# Patient Record
Sex: MALE | Employment: FULL TIME | URBAN - METROPOLITAN AREA
[De-identification: names, ages, dates, MRNs, and addresses within clinical notes are randomized per-mention and may not be internally consistent; named-entity substitution may affect disease eponyms.]

---

## 2017-05-25 ENCOUNTER — HOSPITAL ENCOUNTER (OUTPATIENT)
Dept: PHYSICAL THERAPY | Age: 32
Discharge: HOME | End: 2017-05-25
Payer: COMMERCIAL

## 2017-05-25 PROCEDURE — 97530 THERAPEUTIC ACTIVITIES: CPT

## 2017-05-25 PROCEDURE — 97140 MANUAL THERAPY 1/> REGIONS: CPT

## 2017-05-25 PROCEDURE — 97110 THERAPEUTIC EXERCISES: CPT

## 2017-05-25 PROCEDURE — 97162 PT EVAL MOD COMPLEX 30 MIN: CPT
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
## 2017-06-01 ENCOUNTER — APPOINTMENT (OUTPATIENT)
Dept: PHYSICAL THERAPY | Age: 32
End: 2017-06-01
Payer: COMMERCIAL

## 2017-06-02 ENCOUNTER — HOSPITAL ENCOUNTER (OUTPATIENT)
Dept: PHYSICAL THERAPY | Age: 32
Discharge: HOME | End: 2017-06-02
Payer: COMMERCIAL

## 2017-06-02 PROCEDURE — 97110 THERAPEUTIC EXERCISES: CPT

## 2017-06-02 PROCEDURE — 97140 MANUAL THERAPY 1/> REGIONS: CPT

## 2017-06-02 NOTE — PROGRESS NOTES
PHYSICAL THERAPY - DAILY TREATMENT NOTE    Patient Name: Rosenda Pan        Date: 2017  : 1985   YES Patient  Verified  Visit #:   2     Insurance: Payor: Annette Champion / Plan:  [address] [facility] / Product Type: Commerical /      In time: 735 Out time: 820   Total Treatment Time: 45     Medicare Time Tracking (below)   Total Timed Codes (min):  NA 1:1 Treatment Time:  NA     TREATMENT AREA =  R shoulder    SUBJECTIVE    Pain Level (on 0 to 10 scale):   10   Medication Changes/New allergies or changes in medical history, any new surgeries or procedures? YES     If yes, update Summary List   Subjective Functional Status/Changes:  []  No changes reported   Pt is taking pain medication for relief. He c/o intermittent pain that increases with movement of his R UE. He is trying not to elevate above shoulder height. OBJECTIVE    Physical Therapy Evaluation - Shoulder    Posture: [x] Poor    [] Fair    [] Good    Describe: rounded shoulders, forward-flexed    Modalities Rationale:                 min [] Estim, type/location:      [] att [] unatt [] w/US [] w/ice [] w/heat     min [] Mechanical Traction: type/lbs     [] pro [] sup [] int [] cont [] before manual [] after manual     min [] Ultrasound, settings/location:        min [] Iontophoresis w/ dexamethasone, location:     [] take home patch [] in clinic   10 min [x]  Ice [] Heat location/position: Supine to R shoulder     min [] Vasopneumatic Device, press/temp:       min [] Other:     [] Skin assessment post-treatment (if applicable):   [] intact [] redness- no adverse reaction   []redness  adverse reaction:       27 min Therapeutic Exercise:  [x]  See flow sheet   Rationale:      increase ROM and increase strength to improve the patients ability to perform ADL's with improved periscapular stability. min Therapeutic Activity: FOTO Administration, review of apporpriate posturing with work ADL's. Rationale:    To determine a functional baseline from which to build a therapeutic exercise program.    8 min Manual Therapy: Manual TPR R levator scapulae and PROM of R UE. Rationale:      decrease pain, increase ROM and increase tissue extensibility to improve patient's ability to perform ADL's with decreased radicular symptoms. min Patient Education:  YES  Reviewed HEP   []  Progressed/Changed HEP based on:   HEP issued     Other Objective/Functional Measures:    R UE AROM:  Flex= 155  Abd= 85  ER= 55  Painful along posterior/middle delt with all end ranges. Post Treatment Pain Level (on 0 to 10) scale:   4  / 10     ASSESSMENT    Assessment/Changes in Function:     Advised and instructed pt to proper posture, especially prior to movement of R UE to avoid impingement pain. No adverse effects to new there ex. []  See Progress Note/Recertification   Patient will continue to benefit from skilled PT services to modify and progress therapeutic interventions, address functional mobility deficits, address ROM deficits, address strength deficits, analyze and address soft tissue restrictions, analyze and cue movement patterns, analyze and modify body mechanics/ergonomics and assess and modify postural abnormalities to attain remaining goals. Progress toward goals / Updated goals: · Short Term Goals: To be accomplished in 2-3 weeks:  1. Patient will demonstrate compliance with HEP for symptom management at home. 2. Patient will centralize R UE sx to level of C/S to demonstrate effectiveness of directional preference exercises and decrease risk of UE dysfunction. 3. Patient will report at least 50% symptom improvement associated with an 8 hour work day. · Long Term Goals: To be accomplished in 4-6 weeks:  1. Patient will be independent with HEP to self-manage and prevent symptoms upon DC. 2. Patient will improve FOTO score by 8 points to indicate improved functional status.   3. Patient will demonstrate at least 4+/5 lower trapezius MMT to increase stability with OH ADL's.  4. Patient will demonstrate at least 50% c/s extension AROM to increase efficiency with household ADL's.      PLAN    [x]  Upgrade activities as tolerated YES Continue plan of care   []  Discharge due to :    [x]  Other: Initiate PoC     Therapist: Neptali Horne    Date: 6/2/2017 Time: 10:56 AM

## 2017-06-06 ENCOUNTER — HOSPITAL ENCOUNTER (OUTPATIENT)
Dept: PHYSICAL THERAPY | Age: 32
Discharge: HOME | End: 2017-06-06
Payer: COMMERCIAL

## 2017-06-06 PROCEDURE — 97110 THERAPEUTIC EXERCISES: CPT

## 2017-06-06 PROCEDURE — 97140 MANUAL THERAPY 1/> REGIONS: CPT

## 2017-06-06 NOTE — PROGRESS NOTES
PHYSICAL THERAPY - DAILY TREATMENT NOTE    Patient Name: Phu Trejo        Date: 2017  : 1985   YES Patient  Verified  Visit #:   3   of   12  Insurance: Payor: Jefferson Coffin / Plan: [address] [facility] / Product Type: Commerical /      In time: 7:30 Out time: 8:10   Total Treatment Time: 40     TREATMENT AREA = Right shoulder pain [M25.511]  Neck pain [M54.2]    SUBJECTIVE    Pain Level (on 0 to 10 scale):  4  / 10   Medication Changes/New allergies or changes in medical history, any new surgeries or procedures? NO    If yes, update Summary List   Subjective Functional Status/Changes:  []  No changes reported     \"It's not bad right now. It's at the top of my R shoulder, nothing going down the arm. \"          OBJECTIVE    32 min Therapeutic Exercise:  [x]  See flow sheet   Rationale:      increase ROM, increase strength/stability, facilitate proper motor control. 10 min Manual Therapy: Cervical (C5-6) R Rotatory HVLAT (Cradle Hold)  Supine repeated C/S retraction mobilization   Rationale:      decrease pain, increase ROM, increase tissue extensibility, decrease trigger points and facilitate proper motor control     Throughout Rx min Patient Education:  YES   Reviewed HEP   []  Progressed/Changed HEP based on:   Display of proper form in clinic. Improvement in condition and complaints     Other Objective/Functional Measures:    Patient educated on purpose of manipulations, neurophysiological effects, biochemical effects, and biomechanical effects in relation to his condition. He verbalized understanding. Patient assessed for appropriateness pre-HVLAT, after which consent to proceed was given. Therapist discussed post-manipulation effects and any adverse reactions (if appropriate) to determine further PT intervention through manipulation. Patient reported immediate C/S relief. R UT region pain abolished with repeated movements.          Post Treatment Pain Level (on 0 to 10) scale:   0  / 10 ASSESSMENT    Assessment/Changes in Function:     Posterior derangement confirmed. []  See Progress Note/Recertification   Patient will continue to benefit from skilled PT services to modify and progress therapeutic interventions, address functional mobility deficits, address ROM deficits, address strength deficits, analyze and address soft tissue restrictions, analyze and cue movement patterns, analyze and modify body mechanics/ergonomics and instruct in home and community integration  to attain remaining goals   Progress toward goals / Updated goals:    Maintaining HEP compliance. PLAN    [x]  Upgrade activities as tolerated YES Continue plan of care   []  Discharge due to :    []  Other:      Therapist: Chikis Mccann \"BJ\" Victor Manuel, [place], Cert. MDT, Cert. DN, Cert.  SMT    Date: 6/6/2017 Time: 7:32 AM   Future Appointments  Date Time Provider Rj Garcia   6/8/2017 7:30 AM Humberto Maldonado, PT [facility]   6/12/2017 7:30 AM Humberto Maldonado, PT [facility]   6/15/2017 7:30 AM [facility] PT [address]   6/19/2017 7:30 AM Humberto Maldonado, PT [facility]   6/21/2017 7:30 AM Humberto Maldonado, PT [facility]   6/26/2017 7:30 AM [facility] PT [address]   6/29/2017 7:30 AM [facility] PT [address]

## 2017-06-08 ENCOUNTER — HOSPITAL ENCOUNTER (OUTPATIENT)
Dept: PHYSICAL THERAPY | Age: 32
Discharge: HOME | End: 2017-06-08
Payer: COMMERCIAL

## 2017-06-08 PROCEDURE — 97110 THERAPEUTIC EXERCISES: CPT

## 2017-06-08 PROCEDURE — 97140 MANUAL THERAPY 1/> REGIONS: CPT

## 2017-06-08 NOTE — PROGRESS NOTES
PHYSICAL THERAPY - DAILY TREATMENT NOTE    Patient Name: Beatrice Mayo        Date: 2017  : 1985   YES Patient  Verified  Visit #:   4     Insurance: Payor: Carrie Suárez / Plan: [employer]  [facility] / Product Type: Commerical /      In time: 730 Out time: 820   Total Treatment Time: 50     TREATMENT AREA = Right shoulder pain [M25.511]  Neck pain [M54.2]    SUBJECTIVE    Pain Level (on 0 to 10 scale):  6  / 10   Medication Changes/New allergies or changes in medical history, any new surgeries or procedures? NO    If yes, update Summary List   Subjective Functional Status/Changes:  []  No changes reported     \"Doing ok I guess but my neck is bothering me today. More so than my arm\"          OBJECTIVE    40 min Therapeutic Exercise:  [x]  See flow sheet   Rationale:      increase ROM, increase strength/stability, facilitate proper motor control. 10 min Manual Therapy: Shoulder mobs and C/S mobs and manips   Rationale:      decrease pain, increase ROM, increase tissue extensibility, decrease trigger points and facilitate proper motor control     Throughout Rx min Patient Education:  YES   Reviewed HEP   []  Progressed/Changed HEP based on:   Display of proper form in clinic. Improvement in condition and complaints     Other Objective/Functional Measures:    Pt performed therex without commenting on difficulty or pain.  When asked, pt reported no pain or difficulty     Post Treatment Pain Level (on 0 to 10) scale:   0  / 10     ASSESSMENT    Assessment/Changes in Function:     Pt is able to perform therex without difficulty or pain and is ready to progress in reps/resistance     []  See Progress Note/Recertification   Patient will continue to benefit from skilled PT services to modify and progress therapeutic interventions, address functional mobility deficits, address ROM deficits, address strength deficits, analyze and address soft tissue restrictions, analyze and cue movement patterns, analyze and modify body mechanics/ergonomics and assess and modify postural abnormalities  to attain remaining goals   Progress toward goals / Updated goals:    Pt remains complaint to HEP and is progressing towards centralizing R UE sx to alleviate pain     PLAN    [x]  Upgrade activities as tolerated YES Continue plan of care   []  Discharge due to :    []  Other:      Therapist: Natty Zavala, SPT  Sammy \"BJ\" Xin Hair, DPT, Cert. MDT, Cert. DN, Cert.  SMT    Date: 6/8/2017 Time: 8:36 AM   Future Appointments  Date Time Provider Rj Garcia   6/12/2017 7:30 AM Seth Herrera, PT [facility]   6/15/2017 7:30 AM [facility] PT [address]   6/19/2017 7:30 AM Seth Herrera PT [facility]   6/21/2017 7:30 AM Seth Herrera PT [facility]   6/26/2017 7:30 AM [facility] PT [address]   6/29/2017 7:30 AM [facility] PT [address]       \

## 2017-06-15 ENCOUNTER — HOSPITAL ENCOUNTER (OUTPATIENT)
Dept: PHYSICAL THERAPY | Age: 32
Discharge: HOME | End: 2017-06-15
Payer: COMMERCIAL

## 2017-06-15 PROCEDURE — 97110 THERAPEUTIC EXERCISES: CPT

## 2017-06-15 PROCEDURE — 97140 MANUAL THERAPY 1/> REGIONS: CPT

## 2017-06-15 NOTE — PROGRESS NOTES
PHYSICAL THERAPY - DAILY TREATMENT NOTE    Patient Name: Eartha Homans        Date: 6/15/2017  : 1985   YES Patient  Verified  Visit #:   5     Insurance: Payor: Reena Donald / Plan: [facility]  CAPITATED PT / Product Type: Commerical /      In time: 027 Out time: 820   Total Treatment Time: 45     TREATMENT AREA = Right shoulder pain [M25.511]  Neck pain [M54.2]    SUBJECTIVE    Pain Level (on 0 to 10 scale):  0  / 10   Medication Changes/New allergies or changes in medical history, any new surgeries or procedures? NO    If yes, update Summary List   Subjective Functional Status/Changes:  []  No changes reported     Pt reports no pain this morning and reports overall less periods of symptoms going into R arm. He will be working a 13 hour day today at [employer]. OBJECTIVE    37 min Therapeutic Exercise:  [x]  See flow sheet   Rationale:      increase ROM, increase strength/stability, facilitate proper motor control. 8 min Manual Therapy: Shoulder mobs and C/S mobs   Rationale:      decrease pain, increase ROM, increase tissue extensibility, decrease trigger points and facilitate proper motor control     Throughout Rx min Patient Education:  YES   Reviewed HEP   []  Progressed/Changed HEP based on:   Display of proper form in clinic. Improvement in condition and complaints     Other Objective/Functional Measures:    R shoulder ROM WNL. Post Treatment Pain Level (on 0 to 10) scale:   0  / 10     ASSESSMENT    Assessment/Changes in Function:     Tightness noted in posterior GHJ capsule. Pt able to complete new there ex with no adverse effects.      []  See Progress Note/Recertification   Patient will continue to benefit from skilled PT services to modify and progress therapeutic interventions, address functional mobility deficits, address ROM deficits, address strength deficits, analyze and address soft tissue restrictions, analyze and cue movement patterns, analyze and modify body mechanics/ergonomics and assess and modify postural abnormalities  to attain remaining goals   Progress toward goals / Updated goals:    Radicular symptoms occurring less frequently. R UE mobility WNL with some end range tightness. PLAN    [x]  Upgrade activities as tolerated YES Continue plan of care   []  Discharge due to :    []  Other:      Therapist: Jada Hays, SPT  Sammy \"BJ\" Petey Carter, DPT, Cert. MDT, Cert. DN, Cert.  SMT    Date: 6/15/2017 Time: 8:36 AM     Future Appointments  Date Time Provider Rj Garcia   6/15/2017 7:30 AM [facility] PT [address]   6/19/2017 7:30 AM Diana Carrillo, PT [facility]   6/21/2017 7:30 AM Diana Carrillo PT [facility]   6/26/2017 7:30 AM [facility] PT [address]   6/29/2017 7:30 AM [facility] PT [address]       \

## 2017-06-19 ENCOUNTER — HOSPITAL ENCOUNTER (OUTPATIENT)
Dept: PHYSICAL THERAPY | Age: 32
Discharge: HOME | End: 2017-06-19
Payer: COMMERCIAL

## 2017-06-19 PROCEDURE — 97110 THERAPEUTIC EXERCISES: CPT

## 2017-06-19 NOTE — PROGRESS NOTES
PHYSICAL THERAPY - DAILY TREATMENT NOTE    Patient Name: Elizabeth Sánchez        Date: 2017  : 1985   YES Patient  Verified  Visit #:     Insurance: Payor: Jordon Mendes / Plan: [employer]  [employer] / Product Type: Com[employer] /      In time: 740 Out time: 820   Total Treatment Time: 40     TREATMENT AREA = Right shoulder pain [M25.511]  Neck pain [M54.2]    SUBJECTIVE    Pain Level (on 0 to 10 scale):  1-2  / 10   Medication Changes/New allergies or changes in medical history, any new surgeries or procedures? NO    If yes, update Summary List   Subjective Functional Status/Changes:  []  No changes reported     \"My neck is bothering me a little bit today, I think I ended up falling asleep in a weird way or something\"          OBJECTIVE    30 min Therapeutic Exercise:  [x]  See flow sheet   Rationale:      increase ROM, increase strength/stability, facilitate proper motor control. Modalities Rationale: Prophylaxis/Palliative    min [] Estim, type/location:                                     []  att     []  unatt     []  w/US     []  w/ice    []  W/heat    []  before manual    []  after manual    min []  Mechanical Traction: type/lbs                   []  pro   []  sup   []  int   []  cont    []  before manual    []  after manual    min []  Ultrasound, settings/location:      min []  Iontophoresis w/ dexamethasone, location:                                               []  take home patch       []  in clinic   10 min [x]  Ice     []  Heat    Position/location: R shoulder supine    min []  Vasopneumatic Device, press/temp:    [] Skin assessment post-treatment (if applicable):    []  intact    []  redness- no adverse reaction     []redness  adverse reaction:      Throughout Rx min Patient Education:  YES   Reviewed HEP   []  Progressed/Changed HEP based on:   Display of proper form in clinic.   Improvement in condition and complaints     Other Objective/Functional Measures:    Pt able to perform therex without commenting on difficulty or pain      Post Treatment Pain Level (on 0 to 10) scale:   0  / 10     ASSESSMENT    Assessment/Changes in Function:     Pt will continue to benefit from performing PT to increase strength in order to alleviate pain and promote independent performance of therex at home     []  See Progress Note/Recertification   Patient will continue to benefit from skilled PT services to modify and progress therapeutic interventions, address functional mobility deficits, address ROM deficits, address strength deficits, analyze and address soft tissue restrictions, analyze and cue movement patterns, analyze and modify body mechanics/ergonomics and assess and modify postural abnormalities  to attain remaining goals   Progress toward goals / Updated goals:    Pt remains compliant to HEP and progresses towards increased strength and ROM for increase functionality with ADLs      PLAN    [x]  Upgrade activities as tolerated YES Continue plan of care   []  Discharge due to :    []  Other:      Therapist: Zana Lima, SPT  Sammy \"BJ\" Walter Gould, DPT, Cert. MDT, Cert. DN, Cert.  SMT    Date: 6/19/2017 Time: 7:41 AM   Future Appointments  Date Time Provider Rj Garcia   6/21/2017 7:30 AM Gertrude Carey, PT [facility]   6/26/2017 7:30 AM [facility] PT [address]   6/29/2017 7:30 AM [facility] PT [facility]

## 2017-06-21 ENCOUNTER — HOSPITAL ENCOUNTER (OUTPATIENT)
Dept: PHYSICAL THERAPY | Age: 32
Discharge: HOME | End: 2017-06-21
Payer: COMMERCIAL

## 2017-06-21 PROCEDURE — 97110 THERAPEUTIC EXERCISES: CPT

## 2017-06-21 NOTE — PROGRESS NOTES
PHYSICAL THERAPY - DAILY TREATMENT NOTE    Patient Name: Debbie Cardoza        Date: 2017  : 1985   YES Patient  Verified  Visit #:     Insurance: Payor: Ja Henderson / Plan: [employer]  [employer] PT / Product Type: Commerical /      In time: 065 Out time: 805   Total Treatment Time: 30     TREATMENT AREA = Right shoulder pain [M25.511]  Neck pain [M54.2]    SUBJECTIVE    Pain Level (on 0 to 10 scale):  0  / 10   Medication Changes/New allergies or changes in medical history, any new surgeries or procedures? NO    If yes, update Summary List   Subjective Functional Status/Changes:  []  No changes reported     \"Doing good today. .. No pain. took a warm shower this morning, still waking up but I feel good\"          OBJECTIVE    30 min Therapeutic Exercise:  [x]  See flow sheet   Rationale:      increase ROM, increase strength/stability, facilitate proper motor control. Throughout Rx min Patient Education:  YES   Reviewed HEP   []  Progressed/Changed HEP based on:   Display of proper form in clinic.   Improvement in condition and complaints     Other Objective/Functional Measures:    Pt able to perform therex without commenting on difficulty or pain     Post Treatment Pain Level (on 0 to 10) scale:   0  / 10     ASSESSMENT    Assessment/Changes in Function:     Pt will continue to benefit from performing PT to increase strength in order to alleviate pain while promoting independent performance of therex/HEP at home      []  See Progress Note/Recertification   Patient will continue to benefit from skilled PT services to modify and progress therapeutic interventions, address functional mobility deficits, address ROM deficits, address strength deficits, analyze and address soft tissue restrictions, analyze and cue movement patterns, analyze and modify body mechanics/ergonomics and assess and modify postural abnormalities  to attain remaining goals   Progress toward goals / Updated goals:    Pt remains compliant with HEP and progresses towards discharge     PLAN    [x]  Upgrade activities as tolerated YES Continue plan of care   []  Discharge due to :    []  Other:      Therapist: Silvio Salgado \"BJ\" Allen Pan, YAELT, Cert. MDT, Cert. DN, Cert.  SMT    Date: 6/21/2017 Time: 7:32 AM   Future Appointments  Date Time Provider Rj Garcia   6/26/2017 7:30 AM [address]   6/29/2017 7:30 AM [address]

## 2017-07-12 NOTE — PROGRESS NOTES
[facility] PHYSICAL THERAPY  [address] [place] [address], [address] - Phone: [phone]  Fax: [phone]      Dear Dr. Brooks Simpson MD,   Under your direction, we have been providing physical therapy for your patient Darlene Billingsley, for a diagnosis of Right shoulder pain [M25.511]  Neck pain [M54.2]. The patient was scheduled for 12 visits after his initial evaluation. He attended 6 of his follow up sessions, no-showing the last 3 of the last 5 sessions, and was last seen on 6/21/17. He has not communicated with us his intentions regarding PT. On the last visit he reported no shoulder pain at the time and participated in treatment without complaints of pain recurrence. Due to the inability to further his care from non-compliance to our attendance policy and POC, we are discharging the patient from physical therapy at this time. .     We appreciate the kind referral and would willingly work with this patient again, should he be able to arrange regular attendance and is appropriate for further treatment. Your patient's health is our primary concern. If you have any questions/comments please contact us directly at [phone]. Thank you for allowing us to assist in the care of your patient. Therapist Signature: Deandre Mathis DPT, Cert. MDT, Cert. DN, Cert.  SMT Date: 4/31/1063   Certification Period n/a Time: 10:23 AM   Reporting Period n/a     NOTE TO PHYSICIAN:  PLEASE COMPLETE THE ORDERS BELOW AND FAX TO   [facility] Physical Therapy: [phone]  If you are unable to process this request in 24 hours please contact our office: [phone]    ___ I have read the above report and request that my patient continue as recommended.   ___ I have read the above report and request that my patient continue therapy with the following changes/special instructions:_________________________________________________________   ___ I have read the above report and request that my patient be discharged from therapy.      Physician Signature:        Date:       Time:

## 2018-06-09 ENCOUNTER — HOSPITAL ENCOUNTER (EMERGENCY)
Age: 33
Discharge: HOME | End: 2018-06-09
Attending: EMERGENCY MEDICINE
Payer: COMMERCIAL

## 2018-06-09 VITALS
TEMPERATURE: 98.3 F | HEART RATE: 75 BPM | WEIGHT: 214 LBS | OXYGEN SATURATION: 100 % | BODY MASS INDEX: 28.99 KG/M2 | SYSTOLIC BLOOD PRESSURE: 152 MMHG | HEIGHT: 72 IN | RESPIRATION RATE: 16 BRPM | DIASTOLIC BLOOD PRESSURE: 108 MMHG

## 2018-06-09 DIAGNOSIS — L02.91 ABSCESS: Primary | ICD-10-CM

## 2018-06-09 DIAGNOSIS — L03.90 CELLULITIS, UNSPECIFIED CELLULITIS SITE: ICD-10-CM

## 2018-06-09 PROCEDURE — 75810000289 HC I&D ABSCESS SIMP/COMP/MULT

## 2018-06-09 PROCEDURE — 74011000250 HC RX REV CODE- 250: Performed by: EMERGENCY MEDICINE

## 2018-06-09 PROCEDURE — 99282 EMERGENCY DEPT VISIT SF MDM: CPT

## 2018-06-09 PROCEDURE — 77030019895 HC PCKNG STRP IODO -A

## 2018-06-09 RX ORDER — CLINDAMYCIN HYDROCHLORIDE 300 MG/1
300 CAPSULE ORAL 4 TIMES DAILY
Qty: 40 CAP | Refills: 0 | Status: SHIPPED | OUTPATIENT
Start: 2018-06-09 | End: 2018-06-19

## 2018-06-09 RX ORDER — LIDOCAINE HYDROCHLORIDE AND EPINEPHRINE 20; 10 MG/ML; UG/ML
10 INJECTION, SOLUTION INFILTRATION; PERINEURAL ONCE
Status: COMPLETED | OUTPATIENT
Start: 2018-06-09 | End: 2018-06-09

## 2018-06-09 RX ORDER — AMOXICILLIN 500 MG/1
500 CAPSULE ORAL
COMMUNITY

## 2018-06-09 RX ADMIN — LIDOCAINE HYDROCHLORIDE,EPINEPHRINE BITARTRATE 200 MG: 20; .01 INJECTION, SOLUTION INFILTRATION; PERINEURAL at 08:48

## 2018-06-09 RX ADMIN — Medication 2 ML: at 08:48

## 2018-06-09 NOTE — ED TRIAGE NOTES
Right hip abscess noted 3 days/ seen at [facility] yesterday and placed on antibiotics.

## 2018-06-09 NOTE — DISCHARGE INSTRUCTIONS

## 2018-06-09 NOTE — ED PROVIDER NOTES
EMERGENCY DEPARTMENT HISTORY AND PHYSICAL EXAM    8:25 AM      Date: 6/9/2018  Patient Name: Nestor Soulier    History of Presenting Illness     Chief Complaint   Patient presents with    Abscess         History Provided By: Patient    Chief Complaint: Abscess  Duration: 3 Days  Timing:  Constant and Worsening  Location: R hip  Quality: painful  Severity: 7 out of 10  Modifying Factors: not improved by Abx  Associated Symptoms: denies any other associated signs or symptoms      Additional History (Context): Nestor Soulier is a 34 y.o. male with hypertension and asthma who presents with R hip abscess starting 3 days ago. Pt denies itching, does not recall something biting him. Pt prescribed Amoxicillin by his PCP, took 1 dose PTA; also tried draining the boil at home with a safety pin, peroxide, and alcohol just PTA. Hx occasional etOH, no tobacco.     PCP: Deonna Boyd MD    Current Outpatient Prescriptions   Medication Sig Dispense Refill    amoxicillin (AMOXIL) 500 mg capsule Take 500 mg by mouth.  clindamycin (CLEOCIN) 300 mg capsule Take 1 Cap by mouth four (4) times daily for 10 days. 40 Cap 0    lisinopril (PRINIVIL, ZESTRIL) 20 mg tablet Take  by mouth daily. Past History     Past Medical History:  Past Medical History:   Diagnosis Date    Asthma     Hypertension        Past Surgical History:  History reviewed. No pertinent surgical history. Family History:  History reviewed. No pertinent family history. Social History:  Social History   Substance Use Topics    Smoking status: Never Smoker    Smokeless tobacco: Never Used    Alcohol use None       Allergies: Allergies   Allergen Reactions    Sulfa (Sulfonamide Antibiotics) Hives         Review of Systems       Review of Systems   Constitutional: Negative for chills. HENT: Negative for congestion and sore throat. Respiratory: Negative for cough and shortness of breath. Cardiovascular: Negative for chest pain. Gastrointestinal: Negative for abdominal pain, diarrhea, nausea and vomiting. Genitourinary: Negative for dysuria. Musculoskeletal: Negative for back pain. Skin: Negative for rash. Positive for R hip abscess   Neurological: Negative for dizziness and headaches. All other systems reviewed and are negative. Physical Exam     Visit Vitals    BP (!) 152/108 (BP 1 Location: Right arm, BP Patient Position: At rest)    Pulse 75    Temp 98.3 °F (36.8 °C)    Resp 16    Ht 6' (1.829 m)    Wt 97.1 kg (214 lb)    SpO2 100%    BMI 29.02 kg/m2         Physical Exam  General Exam: Patient is a well developed and well nourished in no distress. Patient does not appear acutely ill or toxic. Pulmonary Exam: No respiratory distress. The respiratory rate is normal.  No stridor. The breath sounds are equal bilaterally. There are no wheezes, rales, or rhonchi noted. Cardiac Exam: The cardiac rate and rhythm are normal.  No significant murmurs, rubs, or gallops. The peripheral pulses are normal.  Abdominal Exam: Abdomen is soft and non-distended. There is no local tenderness. There is no rebound or guarding noted. Skin and Soft Tissue: The skin is warm and dry. Good color. 7 x 5 cm area of redness and induration to R hip with 4 cm area of fluctuance. Psychiatric: Normal adult with appropriate demeanor and interpersonal interaction. Is oriented to person, place, and time. Diagnostic Study Results     Labs -  No results found for this or any previous visit (from the past 12 hour(s)). Radiologic Studies -   No orders to display         Medical Decision Making   I am the first provider for this patient. I reviewed the vital signs, available nursing notes, past medical history, past surgical history, family history and social history. Vital Signs-Reviewed the patient's vital signs.     Pulse Oximetry Analysis -  100% on room air (Interpretation) nonhypoxic    Cardiac Monitor:  Rate: 76      Records Reviewed: Nursing Notes (Time of Review: 8:25 AM)    ED Course: Progress Notes, Reevaluation, and Consults:      Provider Notes (Medical Decision Making): Pt with abscess to R hip - possibly from bug bite. Placed on amoxicillin yesterday. Does not meet SIRS criteria. Consented to I&D. Tolerated procedure well. Pt aware to stop amoxicillin and to start on clindamycin for MRSA coverage. Aware of need for follow up with PCP or ED for packing removal and assessing the wound, aware further I&D may be needed. Pt also aware of strict return precautions and comfortable with plan for discharge. Discussed importance of keeping wound clean/dry. Procedures:     I&D Abcess Complex  Date/Time: 6/9/2018 9:34 AM  Performed by: OSEAS BUCK  Authorized by: RAO, Joen Merlin     Consent:     Consent obtained:  Written    Consent given by:  Patient    Risks discussed:  Bleeding, incomplete drainage and pain    Alternatives discussed:  No treatment and delayed treatment  Universal protocol:     Patient identity confirmed:  Verbally with patient  Location:     Type:  Abscess    Size:  3 cm    Location:  Lower extremity    Lower extremity location:  Hip    Hip location:  R hip  Pre-procedure details:     Skin preparation:  Betadine  Procedure type:     Complexity:  Complex  Procedure details:     Needle aspiration: no      Incision types:  Single straight    Scalpel blade:  11    Wound management:  Probed and deloculated    Drainage:  Bloody and purulent    Drainage amount: Moderate    Wound treatment:  Wound left open    Packing materials:  1/2 in iodoform gauze    Amount 1/2\" iodoform:  4 cm  Post-procedure details:     Patient tolerance of procedure: Tolerated well, no immediate complications  Comments:      Using ultrasound, confirmed area of abscess that could be amenable to I&D prior to procedure. Diagnosis     Clinical Impression:   1. Abscess    2.  Cellulitis, unspecified cellulitis site Disposition: Discharge     Follow-up Information     Follow up With Details Comments Contact Info    Jeff Marcelino MD In 3 days For wound re-check [address]      [facility] EMERGENCY DEPT  If symptoms worsen or if you are not able to see your PCP. [address] CAN BE REMOVED IN 48 HOURS. PLEASE KEEP DRESSING OVER WOUND CLEAN AND DRY. Patient's Medications   Start Taking    CLINDAMYCIN (CLEOCIN) 300 MG CAPSULE    Take 1 Cap by mouth four (4) times daily for 10 days. Continue Taking    AMOXICILLIN (AMOXIL) 500 MG CAPSULE    Take 500 mg by mouth. LISINOPRIL (PRINIVIL, ZESTRIL) 20 MG TABLET    Take  by mouth daily. These Medications have changed    No medications on file   Stop Taking    No medications on file     _______________________________    Attestations:  [address] [place] [address] acting as a scribe for and in the presence of Edwardo Lazaro MD      June 09, 2018 at 10:11 AM       Provider Attestation:      I personally performed the services described in the documentation, reviewed the documentation, as recorded by the scribe in my presence, and it accurately and completely records my words and actions.  June 09, 2018 at 10:11 AM - Edwardo Lazaro MD    _______________________________

## 2018-07-24 ENCOUNTER — APPOINTMENT (OUTPATIENT)
Dept: GENERAL RADIOLOGY | Age: 33
End: 2018-07-24
Attending: NURSE PRACTITIONER
Payer: COMMERCIAL

## 2018-07-24 ENCOUNTER — HOSPITAL ENCOUNTER (EMERGENCY)
Age: 33
Discharge: HOME | End: 2018-07-24
Attending: EMERGENCY MEDICINE
Payer: COMMERCIAL

## 2018-07-24 VITALS
SYSTOLIC BLOOD PRESSURE: 154 MMHG | DIASTOLIC BLOOD PRESSURE: 83 MMHG | WEIGHT: 200 LBS | RESPIRATION RATE: 16 BRPM | BODY MASS INDEX: 27.12 KG/M2 | OXYGEN SATURATION: 100 % | HEART RATE: 82 BPM | TEMPERATURE: 98.5 F

## 2018-07-24 DIAGNOSIS — R19.7 DIARRHEA, UNSPECIFIED TYPE: Primary | ICD-10-CM

## 2018-07-24 DIAGNOSIS — B20 HISTORY OF HIV INFECTION (HCC): ICD-10-CM

## 2018-07-24 LAB
ANION GAP SERPL CALC-SCNC: 6 MMOL/L (ref 3–18)
BASOPHILS # BLD: 0 K/UL (ref 0–0.1)
BASOPHILS NFR BLD: 0 % (ref 0–2)
BUN SERPL-MCNC: 11 MG/DL (ref 7–18)
BUN/CREAT SERPL: 7 (ref 12–20)
CALCIUM SERPL-MCNC: 9.1 MG/DL (ref 8.5–10.1)
CHLORIDE SERPL-SCNC: 106 MMOL/L (ref 100–108)
CO2 SERPL-SCNC: 32 MMOL/L (ref 21–32)
CREAT SERPL-MCNC: 1.64 MG/DL (ref 0.6–1.3)
DIFFERENTIAL METHOD BLD: ABNORMAL
EOSINOPHIL # BLD: 0.2 K/UL (ref 0–0.4)
EOSINOPHIL NFR BLD: 2 % (ref 0–5)
ERYTHROCYTE [DISTWIDTH] IN BLOOD BY AUTOMATED COUNT: 14.1 % (ref 11.6–14.5)
GLUCOSE SERPL-MCNC: 91 MG/DL (ref 74–99)
HCT VFR BLD AUTO: 42.3 % (ref 36–48)
HGB BLD-MCNC: 14.2 G/DL (ref 13–16)
LYMPHOCYTES # BLD: 2.8 K/UL (ref 0.9–3.6)
LYMPHOCYTES NFR BLD: 35 % (ref 21–52)
MCH RBC QN AUTO: 28.5 PG (ref 24–34)
MCHC RBC AUTO-ENTMCNC: 33.6 G/DL (ref 31–37)
MCV RBC AUTO: 84.9 FL (ref 74–97)
MONOCYTES # BLD: 0.9 K/UL (ref 0.05–1.2)
MONOCYTES NFR BLD: 11 % (ref 3–10)
NEUTS SEG # BLD: 4 K/UL (ref 1.8–8)
NEUTS SEG NFR BLD: 52 % (ref 40–73)
PLATELET # BLD AUTO: 259 K/UL (ref 135–420)
PMV BLD AUTO: 9.4 FL (ref 9.2–11.8)
POTASSIUM SERPL-SCNC: 3.9 MMOL/L (ref 3.5–5.5)
RBC # BLD AUTO: 4.98 M/UL (ref 4.7–5.5)
SODIUM SERPL-SCNC: 144 MMOL/L (ref 136–145)
WBC # BLD AUTO: 7.8 K/UL (ref 4.6–13.2)

## 2018-07-24 PROCEDURE — 96374 THER/PROPH/DIAG INJ IV PUSH: CPT

## 2018-07-24 PROCEDURE — 80048 BASIC METABOLIC PNL TOTAL CA: CPT | Performed by: NURSE PRACTITIONER

## 2018-07-24 PROCEDURE — 74019 RADEX ABDOMEN 2 VIEWS: CPT

## 2018-07-24 PROCEDURE — 87209 SMEAR COMPLEX STAIN: CPT | Performed by: EMERGENCY MEDICINE

## 2018-07-24 PROCEDURE — 74011250636 HC RX REV CODE- 250/636: Performed by: NURSE PRACTITIONER

## 2018-07-24 PROCEDURE — 99282 EMERGENCY DEPT VISIT SF MDM: CPT

## 2018-07-24 PROCEDURE — 87045 FECES CULTURE AEROBIC BACT: CPT | Performed by: EMERGENCY MEDICINE

## 2018-07-24 PROCEDURE — 87493 C DIFF AMPLIFIED PROBE: CPT | Performed by: EMERGENCY MEDICINE

## 2018-07-24 PROCEDURE — 85025 COMPLETE CBC W/AUTO DIFF WBC: CPT | Performed by: NURSE PRACTITIONER

## 2018-07-24 RX ORDER — DIPHENOXYLATE HYDROCHLORIDE AND ATROPINE SULFATE 2.5; .025 MG/1; MG/1
1 TABLET ORAL
Qty: 20 TAB | Refills: 0 | Status: SHIPPED | OUTPATIENT
Start: 2018-07-24

## 2018-07-24 RX ORDER — ONDANSETRON 2 MG/ML
4 INJECTION INTRAMUSCULAR; INTRAVENOUS
Status: COMPLETED | OUTPATIENT
Start: 2018-07-24 | End: 2018-07-24

## 2018-07-24 RX ADMIN — ONDANSETRON 4 MG: 2 INJECTION, SOLUTION INTRAMUSCULAR; INTRAVENOUS at 20:02

## 2018-07-25 LAB
BACTERIA SPEC CULT: NORMAL
SERVICE CMNT-IMP: NORMAL

## 2018-07-25 NOTE — DISCHARGE INSTRUCTIONS
SPECIFIC PATIENT INSTRUCTIONS FROM THE PHYSICIAN WHO TREATED YOU IN THE ER TODAY:  1. Use over the counter imodium as directed on the packaging for diarrhea. 2. Over the counter imodium for diarrhea. IF lomotil was prescribed, take it for diarrhea not controlled after using imodium for at least 24 hours. 3. FOLLOW UP APPOINTMENT:  Your primary doctor and GI doctor in 2-3 days for reevaluation. Diarrhea: Care Instructions  Your Care Instructions    Diarrhea is loose, watery stools (bowel movements). The exact cause is often hard to find. Sometimes diarrhea is your body's way of getting rid of what caused an upset stomach. Viruses, food poisoning, and many medicines can cause diarrhea. Some people get diarrhea in response to emotional stress, anxiety, or certain foods. Almost everyone has diarrhea now and then. It usually isn't serious, and your stools will return to normal soon. The important thing to do is replace the fluids you have lost, so you can prevent dehydration. The doctor has checked you carefully, but problems can develop later. If you notice any problems or new symptoms, get medical treatment right away. Follow-up care is a key part of your treatment and safety. Be sure to make and go to all appointments, and call your doctor if you are having problems. It's also a good idea to know your test results and keep a list of the medicines you take. How can you care for yourself at home? · Watch for signs of dehydration, which means your body has lost too much water. Dehydration is a serious condition and should be treated right away. Signs of dehydration are:  ¨ Increasing thirst and dry eyes and mouth. ¨ Feeling faint or lightheaded. ¨ Darker urine, and a smaller amount of urine than normal.  · To prevent dehydration, drink plenty of fluids, enough so that your urine is light yellow or clear like water. Choose water and other caffeine-free clear liquids until you feel better.  If you have kidney, heart, or liver disease and have to limit fluids, talk with your doctor before you increase the amount of fluids you drink. · Begin eating small amounts of mild foods the next day, if you feel like it. ¨ Try yogurt that has live cultures of Lactobacillus. (Check the label.)  ¨ Avoid spicy foods, fruits, alcohol, and caffeine until 48 hours after all symptoms are gone. ¨ Avoid chewing gum that contains sorbitol. ¨ Avoid dairy products (except for yogurt with Lactobacillus) while you have diarrhea and for 3 days after symptoms are gone. · The doctor may recommend that you take over-the-counter medicine, such as loperamide (Imodium), if you still have diarrhea after 6 hours. Read and follow all instructions on the label. Do not use this medicine if you have bloody diarrhea, a high fever, or other signs of serious illness. Call your doctor if you think you are having a problem with your medicine. When should you call for help? Call 911 anytime you think you may need emergency care. For example, call if:    · You passed out (lost consciousness).     · Your stools are maroon or very bloody.    Call your doctor now or seek immediate medical care if:    · You are dizzy or lightheaded, or you feel like you may faint.     · Your stools are black and look like tar, or they have streaks of blood.     · You have new or worse belly pain.     · You have symptoms of dehydration, such as:  ¨ Dry eyes and a dry mouth. ¨ Passing only a little dark urine. ¨ Feeling thirstier than usual.     · You have a new or higher fever.    Watch closely for changes in your health, and be sure to contact your doctor if:    · Your diarrhea is getting worse.     · You see pus in the diarrhea.     · You are not getting better after 2 days (48 hours). Where can you learn more? Go to http://jayesh-north.info/. Enter C168 in the search box to learn more about \"Diarrhea: Care Instructions. \"  Current as of: November 2017  Content Version: 11.7  © 7780-9605 [employer]. Care instructions adapted under license by [employer] (which disclaims liability or warranty for this information). If you have questions about a medical condition or this instruction, always ask your healthcare professional. [address] any warranty or liability for your use of this information. MyCyon[employer] Tonic Activation    Thank you for requesting access to [employer]. Please follow the instructions below to securely access and download your online medical record. [employer] allows you to send messages to your doctor, view your test results, renew your prescriptions, schedule appointments, and more. How Do I Sign Up? 1. In your internet browser, go to [email or web address]. 2. Click on the First Time User? Click Here link in the Sign In box. You will see the New Member Sign Up page. 3. Enter your Cloud[employer] Access Code exactly as it appears below. You will not need to use this code after youve completed the sign-up process. If you do not sign up before the expiration date, you must request a new code. [employer] Access Code: [email or web address]  Expires: 2018  8:14 AM (This is the date your Cloud[employer] access code will )    4. Enter the last four digits of your Social Security Number (xxxx) and Date of Birth (mm/dd/yyyy) as indicated and click Submit. You will be taken to the next sign-up page. 5. Create a CloudDock ID. This will be your [employer] login ID and cannot be changed, so think of one that is secure and easy to remember. 6. Create a Cloud[employer] password. You can change your password at any time. 7. Enter your Password Reset Question and Answer. This can be used at a later time if you forget your password. 8. Enter your e-mail address. You will receive e-mail notification when new information is available in [address] 9. Click Sign Up.  You can now view and download portions of your medical record. 10. Click the Download Summary menu link to download a portable copy of your medical information. Additional Information    If you have questions, please visit the Frequently Asked Questions section of the [employer] website at [email or web address] [email or web address]/[email or web address] [email or web address]/. Remember, [employer] is NOT to be used for urgent needs. For medical emergencies, dial 911.

## 2018-07-25 NOTE — ED PROVIDER NOTES
[facility] EMERGENCY DEPT      9:18 PM    Date: 7/24/2018  Patient Name: Freda Loya    History of Presenting Illness     Chief Complaint   Patient presents with    Dizziness    Diarrhea       History Provided By: Patient    Chief Complaint: Diarrhea  Duration: 2 Weeks  Timing:  Constant  Location: GI  Quality: N/A  Severity: N/A  Modifying Factors: Nothing makes it better or worse  Associated Symptoms: denies any other associated signs or symptoms    34 y.o. male with noted past medical history of asthma and HTN who presents to the emergency department that he has been having diarrhea for the past 2 weeks and it has been constant whener he eats or drinks anything. He said that within 3 minutes of eating or drinking anything he will have diarrhea. Patient said that he went to a [facility] ER last Thursday (07/19/2018) and they did not find anything wrong and he also had a colonoscopy yesterday (07/23/2018) with no problems. He states that its coming out as a green color. Patient denies smoking, drinking ETOH, nausea and vomiting. Patient said that he has had GI problems for the past 8 years. As the patient is without physical symptoms or complaints of pain, there is no severity of pain, quality of pain, duration, modifying factors, or associated signs and symptoms regarding the pt's presenting complaint. No other concerns or symptoms at this time. No other complaints. Nursing notes regarding the HPI and triage nursing notes were reviewed. Prior medical records were reviewed. Current Outpatient Prescriptions   Medication Sig Dispense Refill    amoxicillin (AMOXIL) 500 mg capsule Take 500 mg by mouth.  lisinopril (PRINIVIL, ZESTRIL) 20 mg tablet Take  by mouth daily. Past History     Past Medical History:  Past Medical History:   Diagnosis Date    Asthma     Hypertension        Past Surgical History:  History reviewed. No pertinent surgical history.     Family History:  History reviewed. No pertinent family history. Social History:  Social History   Substance Use Topics    Smoking status: Never Smoker    Smokeless tobacco: Never Used    Alcohol use None       Allergies: Allergies   Allergen Reactions    Sulfa (Sulfonamide Antibiotics) Hives       Patient's primary care provider (as noted in EPIC): Suyapa Corral MD    Review of Systems   Constitutional: Negative for diaphoresis. HENT: Negative for congestion. Eyes: Negative for discharge. Respiratory: Negative for stridor. Cardiovascular: Negative for palpitations. Gastrointestinal: Positive for diarrhea. Negative for nausea and vomiting. Genitourinary: Negative for flank pain. Musculoskeletal: Negative for back pain. Neurological: Negative for weakness. Psychiatric/Behavioral: Negative for hallucinations. All other systems reviewed and are negative. Visit Vitals    /83    Pulse 82    Temp 98.5 °F (36.9 °C)    Resp 16    Wt 90.7 kg (200 lb)    SpO2 100%    BMI 27.12 kg/m2       PHYSICAL EXAM:    CONSTITUTIONAL:  Alert, in no apparent distress;  well developed;  well nourished. HEAD:  Normocephalic, atraumatic. EYES:  EOMI. Non-icteric sclera. Normal conjunctiva. ENTM:  Nose:  no rhinorrhea. Throat:  no erythema or exudate, mucous membranes moist.  NECK:  No JVD. Supple  RESPIRATORY:  Chest clear, equal breath sounds, good air movement. CARDIOVASCULAR:  Regular rate and rhythm. No murmurs, rubs, or gallops. GI:   Normal bowel sounds, abdomen soft and non-tender. No rebound or guarding. No palpable masses. BACK:  Non-tender. UPPER EXT:  Normal inspection. LOWER EXT:  No edema, no calf tenderness. Distal pulses intact. NEURO:  Moves all four extremities, and grossly normal motor exam.  SKIN:  No rashes;  Normal for age. PSYCH:  Alert and normal affect. DIFFERENTIAL DIAGNOSES/ MEDICAL DECISION MAKING:  Viral diarrhea, food poisoning, bacterial diarrhea. Lower on differential diagnosis in this patient is Clostridium difficile related diarrhea, irritable bowel syndrome, crohn's disease, or ulcerative colitis. Diagnostic Study Results     Abnormal lab results from this emergency department encounter:  Labs Reviewed   CBC WITH AUTOMATED DIFF - Abnormal; Notable for the following:        Result Value    MONOCYTES 11 (*)     All other components within normal limits   METABOLIC PANEL, BASIC - Abnormal; Notable for the following:     Creatinine 1.64 (*)     BUN/Creatinine ratio 7 (*)     GFR est AA 60 (*)     GFR est non-AA 49 (*)     All other components within normal limits   CULTURE, STOOL   C. DIFFICILE/EPI PCR   OVA & PARASITES, STOOL       Lab values for this patient within approximately the last 12 hours:  Recent Results (from the past 12 hour(s))   CBC WITH AUTOMATED DIFF    Collection Time: 07/24/18  7:50 PM   Result Value Ref Range    WBC 7.8 4.6 - 13.2 K/uL    RBC 4.98 4.70 - 5.50 M/uL    HGB 14.2 13.0 - 16.0 g/dL    HCT 42.3 36.0 - 48.0 %    MCV 84.9 74.0 - 97.0 FL    MCH 28.5 24.0 - 34.0 PG    MCHC 33.6 31.0 - 37.0 g/dL    RDW 14.1 11.6 - 14.5 %    PLATELET 642 686 - 714 K/uL    MPV 9.4 9.2 - 11.8 FL    NEUTROPHILS 52 40 - 73 %    LYMPHOCYTES 35 21 - 52 %    MONOCYTES 11 (H) 3 - 10 %    EOSINOPHILS 2 0 - 5 %    BASOPHILS 0 0 - 2 %    ABS. NEUTROPHILS 4.0 1.8 - 8.0 K/UL    ABS. LYMPHOCYTES 2.8 0.9 - 3.6 K/UL    ABS. MONOCYTES 0.9 0.05 - 1.2 K/UL    ABS. EOSINOPHILS 0.2 0.0 - 0.4 K/UL    ABS.  BASOPHILS 0.0 0.0 - 0.1 K/UL    DF AUTOMATED     METABOLIC PANEL, BASIC    Collection Time: 07/24/18  7:50 PM   Result Value Ref Range    Sodium 144 136 - 145 mmol/L    Potassium 3.9 3.5 - 5.5 mmol/L    Chloride 106 100 - 108 mmol/L    CO2 32 21 - 32 mmol/L    Anion gap 6 3.0 - 18 mmol/L    Glucose 91 74 - 99 mg/dL    BUN 11 7.0 - 18 MG/DL    Creatinine 1.64 (H) 0.6 - 1.3 MG/DL    BUN/Creatinine ratio 7 (L) 12 - 20      GFR est AA 60 (L) >60 ml/min/1.73m2    GFR est non-AA 49 (L) >60 ml/min/1.73m2    Calcium 9.1 8.5 - 10.1 MG/DL       Radiologist and cardiologist interpretations if available at time of this note:  No results found. Preliminary review of x-rays by ED Physician. Acute abdominal series xray interpretation shows, Obstructive series negative, Nonspecific bowel gas pattern, no air fluid levels, no free air. Medication(s) ordered for patient during this emergency visit encounter:  Medications   ondansetron (ZOFRAN) injection 4 mg (4 mg IntraVENous Given 7/24/18 2002)       Medical Decision Making     I am the first provider for this patient. I reviewed the vital signs, available nursing notes, past medical history, past surgical history, family history and social history. Vital Signs:  Reviewed the patient's vital signs. ED COURSE:      IMPRESSION AND MEDICAL DECISION MAKING:  Based upon the patient's presentation with noted HPI and PE, along with the work up done in the emergency department, I believe that the patient is having diarrhea and abdominal pain, most likely viral etiology. I do believe though that the patient is well enough for discharge home. If vicodin was prescribed, only a short course was prescribed for breakthrough pain. SPECIFIC PATIENT INSTRUCTIONS FROM THE PHYSICIAN WHO TREATED YOU IN THE ER TODAY:  1. Use over the counter imodium as directed on the packaging for diarrhea. 2. Over the counter imodium for diarrhea. IF lomotil was prescribed, take it for diarrhea not controlled after using imodium for at least 24 hours. 3. FOLLOW UP APPOINTMENT:  Your primary doctor and GI doctor in 2-3 days for reevaluation. Patient is improved, resting quietly and comfortably. The patient will be discharged home. The patient was reassured that these symptoms do not appear to represent a serious or life threatening condition at this time. Warning signs of worsening condition were discussed and understood by the patient. Based on patient's age, coexisting illness, exam, and the results of this ED evaluation, the decision to treat as an outpatient was made. Based on the information available at time of discharge, acute pathology requiring immediate intervention was deemed relative unlikely. While it is impossible to completely exclude the possibility of underlying serious disease or worsening of condition, I feel the relative likelihood is extremely low. I discussed this uncertainty with the patient, who understood ED evaluation and treatment and felt comfortable with the outpatient treatment plan. All questions regarding care, test results, and follow up were answered. The patient is stable and appropriate to discharge. They understand that they should return to the emergency department for any new or worsening symptoms. I stressed the importance of follow up for repeat assessment and possibly further evaluation/treatment. Coding Diagnoses     Clinical Impression:   1. Diarrhea, unspecified type    2. History of HIV infection        Disposition     Disposition:  Home. Osmin Grant M.D. DANILO Board Certified Emergency Physician    Provider Attestation:  If a scribe was utilized in generation of this patient record, I personally performed the services described in the documentation, reviewed the documentation, as recorded by the scribe in my presence, and it accurately records the patient's history of presenting illness, review of systems, patient physical examination, and procedures performed by me as the attending physician. Osmin Grant M.D.   DANILO Board Certified Emergency Physician  7/24/2018.  9:19 PM    Scribe Attestation     Kemar Carrera acting as a scribe for and in the presence of Jabier Lee MD      July 24, 2018 at 9:20 PM       Provider Attestation:      I personally performed the services described in the documentation, reviewed the documentation, as recorded by the scribe in my presence, and it accurately and completely records my words and actions.  July 24, 2018 at 9:20 PM - Kika Andersen MD

## 2018-07-27 LAB
BACTERIA SPEC CULT: NORMAL
SERVICE CMNT-IMP: NORMAL

## 2018-08-06 LAB
O+P SPEC MICRO: NORMAL
O+P STL CONC: NORMAL
SPECIMEN SOURCE: NORMAL